# Patient Record
Sex: FEMALE | Employment: FULL TIME | URBAN - METROPOLITAN AREA
[De-identification: names, ages, dates, MRNs, and addresses within clinical notes are randomized per-mention and may not be internally consistent; named-entity substitution may affect disease eponyms.]

---

## 2024-05-08 LAB
ALT SERPL-CCNC: 7 U/L (ref 0–60)
AST SERPL-CCNC: 14 U/L (ref 0–55)
BUN BLDV-MCNC: 13 MG/DL (ref 3–29)
C REACTIVE PROTEIN (CRP), BODY FLUID: 2.04 MG/DL
C3 COMPLEMENT: 158 MG/DL (ref 90–180)
C4 COMPLEMENT: 24 MG/DL (ref 10–40)
CREAT SERPL-MCNC: 1 MG/DL (ref 0.5–1.2)
ERYTHROCYTE SEDIMENTATION RATE: 84 MM/HR (ref 0–30)
HCT VFR BLD CALC: 33.2 % (ref 34–49)
HEMOGLOBIN: 10.5 G/DL (ref 11.2–15.7)
HEPATITIS B VIRUS LITTLE E AB: NORMAL
HEPATITIS BE ANTIGEN: NORMAL
MCH RBC QN AUTO: 25.3 PG (ref 26–34)
MCHC RBC AUTO-ENTMCNC: 31.6 G/DL (ref 30.7–35.5)
MCV RBC AUTO: 80 FL (ref 80–100)
PDW BLD-RTO: 15 %
PLATELET # BLD: 468 K/UL (ref 140–400)
PMV BLD AUTO: 10 FL (ref 7.2–11.7)
RBC # BLD: 4.15 M/UL (ref 3.95–5.26)
RHEUMATOID FACTOR: 15 IU/ML
TOTAL CK: 47 U/L (ref 0–200)
TSH ULTRASENSITIVE: 2.83 MCIU/ML (ref 0.4–4.5)
URIC ACID, SERUM: 8.9 MG/DL (ref 2.5–7)
WBC # BLD: 7.8 K/UL (ref 3.5–10.9)

## 2024-05-09 LAB
CYCLIC CITRULLINATED PEPTIDE ANTIBODY IGG: >300 EIA
HEPATITIS B CORE TOTAL ANTIBODY: NEGATIVE
HEPATITIS B SURF AG,XHBAGS: 0.7 MIU/ML
HEPATITIS B SURF AG,XHBAGS: NEGATIVE
HEPATITIS C VIRUS RNA SER/PLAS NCNC: NEGATIVE
MYELOPEROXIDASE AB: <=20 EIA
PR3 IGG: <=20 EIA

## 2024-05-10 LAB
ANA BY IFA: NORMAL
ANA PATTERN: NORMAL
DSDNA ANTIBODY: <201 EIA
PROTEIN PATTERN: NORMAL

## 2024-10-05 LAB
ALT SERPL-CCNC: 9 U/L (ref 0–60)
AST SERPL-CCNC: 12 U/L (ref 0–55)
BUN / CREAT RATIO: 18 (ref 7–25)
BUN BLDV-MCNC: 18 MG/DL (ref 3–29)
CREAT SERPL-MCNC: 1 MG/DL (ref 0.5–1.2)
HCT VFR BLD CALC: 33.6 % (ref 34–49)
HEMOGLOBIN: 10.6 G/DL (ref 11.2–15.7)
MCH RBC QN AUTO: 25.4 PG (ref 26–34)
MCHC RBC AUTO-ENTMCNC: 31.5 G/DL (ref 30.7–35.5)
MCV RBC AUTO: 80.4 FL (ref 80–100)
PDW BLD-RTO: 14.9 %
PLATELET # BLD: 447 K/UL (ref 140–400)
PMV BLD AUTO: 9.6 FL (ref 7.2–11.7)
RBC # BLD: 4.18 M/UL (ref 3.95–5.26)
SED RATE, AUTOMATED: 95 MM/HR (ref 0–30)
WBC # BLD: 7.5 K/UL (ref 3.5–10.9)

## 2024-11-10 NOTE — PROGRESS NOTES
should stop this medication before pregnancy/conception is attempted due to birth defects. I discussed the rational for folic acid with MTX.This will require q4 week monitoring of labs x 3 months, then q12 weeks thereafter for potential toxicity.    - Hepatitis Panel, Acute; Future  - Renal Function Panel; Future    6. Current chronic use of systemic steroids  - DEXA BONE DENSITY AXIAL SKELETON; Future       Patient Instructions  Increase methotrexate to 7 pills on saturday  Start folic acid daily except on the days you take methotrexate  Get labs today  We will discuss results at next visit  RTC in 2 months.      -  The patient indicates understanding of these issues and agrees with the plan.    I spent 60 minutes on the date of service, preparing to see the patient (eg, review of tests), obtaining and/or reviewing separately obtained history, counseling, ordering medications, tests, or procedures, documenting clinical information in the electronic or other health record and in care coordination.This note was dictated with voice recognition software        Kika Manning MD

## 2024-11-14 ENCOUNTER — OFFICE VISIT (OUTPATIENT)
Dept: RHEUMATOLOGY | Age: 59
End: 2024-11-14
Payer: COMMERCIAL

## 2024-11-14 VITALS
OXYGEN SATURATION: 94 % | SYSTOLIC BLOOD PRESSURE: 124 MMHG | DIASTOLIC BLOOD PRESSURE: 78 MMHG | WEIGHT: 245.2 LBS | HEART RATE: 100 BPM

## 2024-11-14 DIAGNOSIS — E79.0 HYPERURICEMIA: ICD-10-CM

## 2024-11-14 DIAGNOSIS — Z79.52 CURRENT CHRONIC USE OF SYSTEMIC STEROIDS: ICD-10-CM

## 2024-11-14 DIAGNOSIS — Z79.899 HIGH RISK MEDICATION USE: ICD-10-CM

## 2024-11-14 DIAGNOSIS — R76.8 ANA POSITIVE: ICD-10-CM

## 2024-11-14 DIAGNOSIS — Z01.89 ENCOUNTER FOR OTHER SPECIFIED SPECIAL EXAMINATIONS: ICD-10-CM

## 2024-11-14 DIAGNOSIS — M05.79 RHEUMATOID ARTHRITIS INVOLVING MULTIPLE SITES WITH POSITIVE RHEUMATOID FACTOR (HCC): Primary | ICD-10-CM

## 2024-11-14 PROCEDURE — 99205 OFFICE O/P NEW HI 60 MIN: CPT | Performed by: STUDENT IN AN ORGANIZED HEALTH CARE EDUCATION/TRAINING PROGRAM

## 2024-11-14 RX ORDER — METHOTREXATE 2.5 MG/1
17.5 TABLET ORAL WEEKLY
Qty: 91 TABLET | Refills: 0 | Status: SHIPPED | OUTPATIENT
Start: 2024-11-14

## 2024-11-14 RX ORDER — LOSARTAN POTASSIUM 50 MG/1
TABLET ORAL
COMMUNITY

## 2024-11-14 RX ORDER — ALBUTEROL SULFATE 90 UG/1
INHALANT RESPIRATORY (INHALATION)
COMMUNITY

## 2024-11-14 RX ORDER — MONTELUKAST SODIUM 10 MG/1
10 TABLET ORAL DAILY
COMMUNITY

## 2024-11-14 RX ORDER — PREDNISONE 5 MG/1
TABLET ORAL
Qty: 70 TABLET | Refills: 0 | Status: SHIPPED | OUTPATIENT
Start: 2024-11-14

## 2024-11-14 RX ORDER — FOLIC ACID 1 MG/1
1 TABLET ORAL DAILY
Qty: 90 TABLET | Refills: 0 | Status: SHIPPED | OUTPATIENT
Start: 2024-11-14

## 2024-11-14 RX ORDER — APIXABAN 5 MG/1
5 TABLET, FILM COATED ORAL 2 TIMES DAILY
COMMUNITY

## 2024-11-14 RX ORDER — BUDESONIDE AND FORMOTEROL FUMARATE DIHYDRATE 160; 4.5 UG/1; UG/1
AEROSOL RESPIRATORY (INHALATION)
COMMUNITY

## 2024-11-14 RX ORDER — OMEPRAZOLE 40 MG/1
30 CAPSULE, DELAYED RELEASE ORAL
COMMUNITY
Start: 2024-06-21

## 2024-11-14 RX ORDER — HYDROCHLOROTHIAZIDE 25 MG/1
TABLET ORAL
COMMUNITY

## 2024-11-14 NOTE — PATIENT INSTRUCTIONS
Patient Instructions  Increase methotrexate to 7 pills on Saturday  Start folic acid daily except on the days you take methotrexate  Continue prednisone 5 mg daily for 4 weeks then reduce to every other day   Get labs today  We will discuss results at next visit  RTC in 2 months.